# Patient Record
Sex: MALE | Race: OTHER | HISPANIC OR LATINO | URBAN - METROPOLITAN AREA
[De-identification: names, ages, dates, MRNs, and addresses within clinical notes are randomized per-mention and may not be internally consistent; named-entity substitution may affect disease eponyms.]

---

## 2020-11-10 ENCOUNTER — EMERGENCY (EMERGENCY)
Facility: HOSPITAL | Age: 35
LOS: 1 days | Discharge: HOME | End: 2020-11-10
Payer: MEDICAID

## 2020-11-10 VITALS
OXYGEN SATURATION: 98 % | TEMPERATURE: 98 F | SYSTOLIC BLOOD PRESSURE: 128 MMHG | DIASTOLIC BLOOD PRESSURE: 81 MMHG | HEART RATE: 75 BPM | RESPIRATION RATE: 18 BRPM | HEIGHT: 72 IN | WEIGHT: 259.93 LBS

## 2020-11-10 PROCEDURE — 99284 EMERGENCY DEPT VISIT MOD MDM: CPT

## 2020-11-10 PROCEDURE — 73120 X-RAY EXAM OF HAND: CPT

## 2020-11-10 PROCEDURE — 73140 X-RAY EXAM OF FINGER(S): CPT

## 2020-11-10 NOTE — ED ADULT TRIAGE NOTE - CHIEF COMPLAINT QUOTE
cast removal to R-hand and L-arm. Patient reports fractures to R-thumb and L-pinky. Patient reports he could not get an appt and was told to come to ER. Patient also reports mechanical fall 5 days ago

## 2020-11-10 NOTE — ED PROVIDER NOTE - SECONDARY DIAGNOSIS.
Closed nondisplaced fracture of proximal phalanx of right thumb with delayed healing, subsequent encounter
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
## 2020-11-10 NOTE — ED PROVIDER NOTE - CARE PROVIDER_API CALL
Cynthia Cleveland)  [address], [facility] [address]  [place] [address]  Phone: [phone]  Fax: [phone]  Follow Up Time: 1-3 Days
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
## 2020-11-10 NOTE — ED PROVIDER NOTE - NSFOLLOWUPINSTRUCTIONS_ED_ALL_ED_FT
Fracture    A fracture is a break in one of your bones. This can occur from a variety of injuries, especially traumatic ones. Symptoms include pain, bruising, or swelling. Do not use the injured limb. If a fracture is in one of the bones below your waist, do not put weight on that limb unless instructed to do so by your healthcare provider. Crutches or a cane may have been provided. A splint or cast may have been applied by your health care provider. Make sure to keep it dry and follow up with an orthopedist as instructed.    SEEK IMMEDIATE MEDICAL CARE IF YOU HAVE ANY OF THE FOLLOWING SYMPTOMS: numbness, tingling, increasing pain, or weakness in any part of the injured limb.      CALL DR NICHOLSON'S OFFICE 1ST THING TOMORROW MORNING, TO ARRANGE FOLLOW UP IN CLINIC WEDNESDAY.

## 2020-11-10 NOTE — ED PROVIDER NOTE - PRINCIPAL DIAGNOSIS
Closed nondisplaced fracture of shaft of fifth metacarpal bone of left hand with delayed healing, subsequent encounter
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
## 2020-11-10 NOTE — ED PROVIDER NOTE - PHYSICAL EXAMINATION
Gen:  Well appearing in NAD  Head:  NC/AT  Resp: No distress   Ext: L hand in short arm volar fiberglass cast.  R hand thumb spica.  Skin: warm and dry as visualized
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
## 2020-11-10 NOTE — ED PROVIDER NOTE - CARE PLAN
Principal Discharge DX:	Closed nondisplaced fracture of shaft of fifth metacarpal bone of left hand with delayed healing, subsequent encounter  Secondary Diagnosis:	Closed nondisplaced fracture of proximal phalanx of right thumb with delayed healing, subsequent encounter

## 2020-11-10 NOTE — ED PROVIDER NOTE - PATIENT PORTAL LINK FT
You can access the FollowMyHealth Patient Portal offered by [facility] by registering at the following website: [email or web address]. By joining [facility]’s FollowMyHealth portal, you will also be able to view your health information using other applications (apps) compatible with our system.

## 2020-11-11 VITALS
RESPIRATION RATE: 16 BRPM | SYSTOLIC BLOOD PRESSURE: 128 MMHG | OXYGEN SATURATION: 99 % | HEART RATE: 78 BPM | TEMPERATURE: 98 F | DIASTOLIC BLOOD PRESSURE: 82 MMHG

## 2020-11-11 PROBLEM — Z00.00 ENCOUNTER FOR PREVENTIVE HEALTH EXAMINATION: Status: ACTIVE | Noted: 2020-11-11

## 2020-11-11 PROCEDURE — 73140 X-RAY EXAM OF FINGER(S): CPT | Mod: 26,RT

## 2020-11-11 PROCEDURE — 73120 X-RAY EXAM OF HAND: CPT | Mod: 26,LT
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
## 2020-11-13 ENCOUNTER — APPOINTMENT (OUTPATIENT)
Dept: ORTHOPEDIC SURGERY | Facility: CLINIC | Age: 35
End: 2020-11-13
Payer: MEDICAID

## 2020-11-13 VITALS
HEIGHT: 72 IN | BODY MASS INDEX: 35.21 KG/M2 | DIASTOLIC BLOOD PRESSURE: 89 MMHG | WEIGHT: 260 LBS | SYSTOLIC BLOOD PRESSURE: 147 MMHG | HEART RATE: 76 BPM

## 2020-11-13 DIAGNOSIS — S62.327D DISPLACED FRACTURE OF SHAFT OF FIFTH METACARPAL BONE, LEFT HAND, SUBSEQUENT ENCOUNTER FOR FRACTURE WITH ROUTINE HEALING: ICD-10-CM

## 2020-11-13 DIAGNOSIS — S62.511D DISPLACED FRACTURE OF PROXIMAL PHALANX OF RIGHT THUMB, SUBSEQUENT ENCOUNTER FOR FRACTURE WITH ROUTINE HEALING: ICD-10-CM

## 2020-11-13 DIAGNOSIS — Z78.9 OTHER SPECIFIED HEALTH STATUS: ICD-10-CM

## 2020-11-13 PROCEDURE — 99204 OFFICE O/P NEW MOD 45 MIN: CPT

## 2020-11-13 PROCEDURE — 73140 X-RAY EXAM OF FINGER(S): CPT | Mod: 59,F5

## 2020-11-13 PROCEDURE — 73130 X-RAY EXAM OF HAND: CPT | Mod: LT

## 2020-11-30 ENCOUNTER — APPOINTMENT (OUTPATIENT)
Dept: ORTHOPEDIC SURGERY | Facility: CLINIC | Age: 35
End: 2020-11-30